# Patient Record
Sex: FEMALE | Race: WHITE | HISPANIC OR LATINO | Employment: FULL TIME | URBAN - METROPOLITAN AREA
[De-identification: names, ages, dates, MRNs, and addresses within clinical notes are randomized per-mention and may not be internally consistent; named-entity substitution may affect disease eponyms.]

---

## 2020-06-06 ENCOUNTER — HOSPITAL ENCOUNTER (OUTPATIENT)
Dept: RADIOLOGY | Facility: MEDICAL CENTER | Age: 45
End: 2020-06-06
Attending: FAMILY MEDICINE
Payer: COMMERCIAL

## 2020-06-06 ENCOUNTER — OCCUPATIONAL MEDICINE (OUTPATIENT)
Dept: URGENT CARE | Facility: PHYSICIAN GROUP | Age: 45
End: 2020-06-06
Payer: COMMERCIAL

## 2020-06-06 VITALS
OXYGEN SATURATION: 99 % | RESPIRATION RATE: 15 BRPM | BODY MASS INDEX: 37.19 KG/M2 | TEMPERATURE: 97.5 F | DIASTOLIC BLOOD PRESSURE: 78 MMHG | SYSTOLIC BLOOD PRESSURE: 112 MMHG | HEIGHT: 61 IN | HEART RATE: 71 BPM | WEIGHT: 197 LBS
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
## 2020-06-06 DIAGNOSIS — S29.019A THORACIC MYOFASCIAL STRAIN, INITIAL ENCOUNTER: ICD-10-CM

## 2020-06-06 DIAGNOSIS — S39.012A STRAIN OF LUMBAR REGION, INITIAL ENCOUNTER: ICD-10-CM

## 2020-06-06 DIAGNOSIS — M54.6 ACUTE BILATERAL THORACIC BACK PAIN: ICD-10-CM

## 2020-06-06 DIAGNOSIS — M54.50 ACUTE BILATERAL LOW BACK PAIN WITHOUT SCIATICA: ICD-10-CM

## 2020-06-06 PROCEDURE — 99203 OFFICE O/P NEW LOW 30 MIN: CPT | Performed by: FAMILY MEDICINE

## 2020-06-06 PROCEDURE — 72070 X-RAY EXAM THORAC SPINE 2VWS: CPT

## 2020-06-06 PROCEDURE — 72100 X-RAY EXAM L-S SPINE 2/3 VWS: CPT

## 2020-06-06 RX ORDER — CYCLOBENZAPRINE HCL 5 MG
5 TABLET ORAL EVERY EVENING
Qty: 15 TAB | Refills: 0 | Status: SHIPPED | OUTPATIENT
Start: 2020-06-06 | End: 2020-06-21

## 2020-06-06 RX ORDER — PHENTERMINE HYDROCHLORIDE 37.5 MG/1
TABLET ORAL DAILY
COMMUNITY
Start: 2020-05-07

## 2020-06-06 ASSESSMENT — PAIN SCALES - GENERAL: PAINLEVEL: 6=MODERATE PAIN

## 2020-06-06 ASSESSMENT — ENCOUNTER SYMPTOMS
SHORTNESS OF BREATH: 0
DIZZINESS: 0
SENSORY CHANGE: 0
FOCAL WEAKNESS: 0
TINGLING: 0
FEVER: 0
VOMITING: 0
FALLS: 1
BACK PAIN: 1

## 2020-06-06 NOTE — LETTER
"EMPLOYEE’S CLAIM FOR COMPENSATION/ REPORT OF INITIAL TREATMENT  FORM C-4    EMPLOYEE’S CLAIM - PROVIDE ALL INFORMATION REQUESTED   First Name  Ellie Last Name  Hao Parra Birthdate                    1975                Sex  female Claim Number   Home Address  [address] Age  45 y.o. Height  1.549 m (5' 1\") Weight  89.4 kg (197 lb) [facility]     [facility] Zip  [address] Telephone  [phone] (home)    Mailing Address  [address] 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 (if applicable)  CLEANING    How did this injury or occupational disease occur? (Be specific an answer in detail. Use additional sheet if necessary)  I WAS ON TOP OF A LADDER CLEANING AND WHEN I TRAY TO GET DOWN I SLIP AD FALL ALL THE WAY TO THE GROUND   If you believe that you have an occupational disease, when did you first have knowledge of the disability and it relationship to your employment?  N/A Witnesses to the Accident  KODIAC      Nature of Injury or Occupational Disease  Contusion  Part(s) of Body Injured or Affected  Lumbar and/or Sacral Vertebrae (Vertebrae NOC Trunk), N/A, N/A    I certify that the above is true and correct to the best of my knowledge and that I " have provided this information in order to obtain the benefits of [place]  I hereby authorize any physician, chiropractor, surgeon, practitioner, or other person, any hospital, including [facility] or [facility], any medical service organization, any insurance company, or other institution or organization to release to each other, any medical or other information, including benefits paid or payable, pertinent to this injury or disease, except information relative to diagnosis, treatment and/or counseling for AIDS, psychological conditions, alcohol or controlled substances, for which I must give specific authorization.  A Photostat of this authorization shall be as valid as the original.     Date   Place   Employee’s Signature   THIS REPORT MUST BE COMPLETED AND MAILED WITHIN 3 WORKING DAYS OF TREATMENT   Place  [facility]  Name of Facility  [facility]   Date  6/6/2020 Diagnosis  (W11.XXXA) Fall from ladder, initial encounter  (S39.012A) Strain of lumbar region, initial encounter  (S29.019A) Thoracic myofascial strain, initial encounter Is there evidence the injured employee was under the influence of alcohol and/or another controlled substance at the time of accident?   Hour  10:15 AM Description of Injury or Disease  Diagnoses of Fall from ladder, initial encounter, Strain of lumbar region, initial encounter, and Thoracic myofascial strain, initial encounter were pertinent to this visit.     Treatment  NSAIDs, heat, light activity, muscle relaxer at night  Have you advised the patient to remain off work five days or more? No   X-Ray Findings      If Yes   From Date  To Date      From information given by the employee, together with medical evidence, can you directly connect this injury or occupational disease as job incurred?    If No Full Duty No Modified Duty  Yes   Is  "additional medical care by a physician indicated?    If Modified Duty, Specify any Limitations / Restrictions  No heavy lifting    Do you know of any previous injury or disease contributing to this condition or occupational disease?                                Date  6/6/2020 Print Doctor’s Name Federico Morales M.D. I certify the employer’s copy of  this form was mailed on:   Address  [address] Insurer’s Use Only     [facility]  [address]    Provider’s Tax ID Number  [id number] Telephone  Dept: [phone]            e-Signature:  ,   Medical Director Degree  MD        ORIGINAL-TREATING PHYSICIAN OR CHIROPRACTOR    PAGE 2-INSURER/TPA    PAGE 3-EMPLOYER    PAGE 4-EMPLOYEE             Form C-4 (rev.10/07)              BRIEF DESCRIPTION OF RIGHTS AND BENEFITS  (Pursuant to [place] 616C.050)    Notice of Injury or Occupational Disease (Incident Report Form C-1): If an injury or occupational disease (OD) arises out of and in the course of employment, you must provide written notice to your employer as soon as practicable, but no later than 7 days after the accident or OD. Your employer shall maintain a sufficient supply of the required forms.    Claim for Compensation (Form C-4): If medical treatment is sought, the form C-4 is available at the place of initial treatment. A completed \"Claim for Compensation\" (Form C-4) must be filed within 90 days after an accident or OD. The treating physician or chiropractor must, within 3 working days after treatment, complete and mail to the employer, the employer's insurer and third-party , the Claim for Compensation.    Medical Treatment: If you require medical treatment for your on-the-job injury or OD, you may be required to select a physician or chiropractor from a list provided by your workers’ compensation insurer, if it has contracted with an Organization for Managed Care (MCO) or Preferred Provider Organization (PPO) or providers of " health care. If your employer has not entered into a contract with an MCO or PPO, you may select a physician or chiropractor from the Panel of Physicians and Chiropractors. Any medical costs related to your industrial injury or OD will be paid by your insurer.    Temporary Total Disability (TTD): If your doctor has certified that you are unable to work for a period of at least 5 consecutive days, or 5 cumulative days in a 20-day period, or places restrictions on you that your employer does not accommodate, you may be entitled to TTD compensation.    Temporary Partial Disability (TPD): If the wage you receive upon reemployment is less than the compensation for TTD to which you are entitled, the insurer may be required to pay you TPD compensation to make up the difference. TPD can only be paid for a maximum of 24 months.    Permanent Partial Disability (PPD): When your medical condition is stable and there is an indication of a PPD as a result of your injury or OD, within 30 days, your insurer must arrange for an evaluation by a rating physician or chiropractor to determine the degree of your PPD. The amount of your PPD award depends on the date of injury, the results of the PPD evaluation and your age and wage.    Permanent Total Disability (PTD): If you are medically certified by a treating physician or chiropractor as permanently and totally disabled and have been granted a PTD status by your insurer, you are entitled to receive monthly benefits not to exceed 66 2/3% of your average monthly wage. The amount of your PTD payments is subject to reduction if you previously received a PPD award.    Vocational Rehabilitation Services: You may be eligible for vocational rehabilitation services if you are unable to return to the job due to a permanent physical impairment or permanent restrictions as a result of your injury or occupational disease.    Transportation and Per Ansley Reimbursement: You may be eligible for travel  expenses and per lakisha associated with medical treatment.    Reopening: You may be able to reopen your claim if your condition worsens after claim closure.    Appeal Process: If you disagree with a written determination issued by the insurer or the insurer does not respond to your request, you may appeal to the Department of Administration, , by following the instructions contained in your determination letter. You must appeal the determination within 70 days from the date of the determination letter at [address], [address], [place], [place] [address], or [address], [address], [place], [place] [address]. If you disagree with the  decision, you may appeal to the Department of Administration, . You must file your appeal within 30 days from the date of the  decision letter at [address], [address], [place], [place] [address], or [address], [facility] [address], [place], [place] [address]. If you disagree with a decision of an , you may file a petition for judicial review with the District Court. You must do so within 30 days of the Appeal Officer’s decision. You may be represented by an  at your own expense or you may contact the [facility] for possible representation.    [place]  for Injured Workers (NAIW): If you disagree with a  decision, you may request that NAIW represent you without charge at an  Hearing. For information regarding denial of benefits, you may contact the [facility] at: [address], [address], [place] [address], [phone], or [address], [address] [facility] [address], [place] [address], [phone]    To File a Complaint with the Division: If you wish to file a complaint with the  of the Division of Industrial Relations (DIR),  please contact the Workers’ Compensation Section, [address], [address], [place], [place] [address],  telephone [phone], or [address] [facility], [place], [place] [address], telephone [phone].    For assistance with Workers’ Compensation Issues: You may contact the Office of the Governor Consumer Health Assistance, [address] [place] [address], [address], [place], [place] [address], Toll Free 1-532.269.5621, Web site: [email or web address]., E-mail [email or web address]                   __________________________________________________________________                                                     _________        Employee Name / Signature                                                                                                                                              Date                                                                                                                                                                                                     D-2 (rev. 06/18)

## 2020-06-06 NOTE — PROGRESS NOTES
"Subjective:     Ellie Parra is a 45 y.o. female who presents for Back Pain (mid to low back pain post falling 4-5 feet off of a ladder at work yesterday)    HPI  Pt presents for evaluation of a new problem   DOI: 6/5/20   JACOB: Fall from a ladder (~4-5 feet high)   Patient landed more on her butt/back  Having moderate low and mid back pain since the accident  No radiating pain into legs  Has bruise on her buttock area, however main pain is much higher  Pain is equal bilaterally and concentrated mostly in the midline  No associated numbness or tingling  Still able to walk, however it is painful     Review of Systems   Constitutional: Negative for fever.   Respiratory: Negative for shortness of breath.    Cardiovascular: Negative for chest pain.   Gastrointestinal: Negative for vomiting.   Musculoskeletal: Positive for back pain and falls.   Skin: Negative for rash.   Neurological: Negative for dizziness, tingling, sensory change and focal weakness.     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     Appearance: She is well-developed. She is not diaphoretic.   HENT:      Head: Normocephalic and atraumatic.   Pulmonary:      Effort: Pulmonary effort is normal.   Musculoskeletal:      Comments: Back:  General: No asymmetry, bruising, or erythema appreciated  Palpation: +TTP of spinous processes in lower thoracic region, no step-offs appreciated, +TTP paraspinal muscles in thoracic and lumbar region, no significant scoliosis appreciated  Strength: 5/5 hip flexion/extension  Special tests: Straight leg raise " -   Neuro: Sensation intact and equal bilaterally in LE's    Bilateral hips:  General: No gross deformity  ROM: flexion 120 degrees, extension 10, ER 60, IR 40, abduction 50, adduction 30   Palpation: No TTP over greater trochanter, groin, pubis, +TTP over right gluteus max, gluteus min  Strength: 5/5 throughout   Special tests: Michelle -, Fadir -, Labral shear -  Neuro: Sensation intact and equal bilaterally in LE's   Skin:     General: Skin is warm and dry.      Findings: No erythema.   Neurological:      Mental Status: She is alert and oriented to person, place, and time.      Sensory: No sensory deficit.   Psychiatric:         Behavior: Behavior normal.         Thought Content: Thought content normal.         Judgment: Judgment normal.         Assessment/Plan:   Assessment    1. Fall from ladder, initial encounter  - DX-LUMBAR SPINE-2 OR 3 VIEWS; Future  - DX-THORACIC SPINE-2 VIEWS; Future  - cyclobenzaprine (FLEXERIL) 5 MG tablet; Take 1 Tab by mouth every evening for 15 days.  Dispense: 15 Tab; Refill: 0    2. Strain of lumbar region, initial encounter  - DX-LUMBAR SPINE-2 OR 3 VIEWS; Future  - cyclobenzaprine (FLEXERIL) 5 MG tablet; Take 1 Tab by mouth every evening for 15 days.  Dispense: 15 Tab; Refill: 0    3. Thoracic myofascial strain, initial encounter  - DX-THORACIC SPINE-2 VIEWS; Future  - cyclobenzaprine (FLEXERIL) 5 MG tablet; Take 1 Tab by mouth every evening for 15 days.  Dispense: 15 Tab; Refill: 0    Patient with fall off a ladder at work causing strains of lumbar and thoracic regions.  X-rays of lumbar and thoracic areas show no acute fracture or dislocation.  Reviewed treatment including over-the-counter NSAIDs, heat, light activity/stretching, avoidance of painful activities, and muscle relaxants at night.  Patient will follow-up in a week to reevaluate and ensure she is healing as expected.

## 2020-06-06 NOTE — LETTER
[facility]  [address] [place] [address]  Phone:  [phone] - Fax:  [phone]   [facility] Progress Report and Disability Certification  Date of Service: 6/6/2020   No Show:  No  Date / Time of Next Visit: 6/13/2020   Claim Information   Patient Name: Ellie Parra  Claim Number:     Employer:   [employer] Date of Injury: 6/5/2020     Insurer / TPA: [employer]  ID / SSN:     Occupation: SANITATION TECH  Diagnosis: Diagnoses of Fall from ladder, initial encounter, Strain of lumbar region, initial encounter, and Thoracic myofascial strain, initial encounter were pertinent to this visit.    Medical Information   Related to Industrial Injury? Yes    Subjective Complaints:  Pt presents for evaluation of a new problem   DOI: 6/5/20   JACOB: Fall from a ladder (~4-5 feet high)   Patient landed more on her butt/back  Having moderate low and mid back pain since the accident  No radiating pain into legs  Has bruise on her buttock area, however main pain is much higher  Pain is equal bilaterally and concentrated mostly in the midline  No associated numbness or tingling  Still able to walk, however it is painful    Objective Findings: Back:  General: No asymmetry, bruising, or erythema appreciated  Palpation: +TTP of spinous processes in lower thoracic region, no step-offs appreciated, +TTP paraspinal muscles in thoracic and lumbar region, no significant scoliosis appreciated  Strength: 5/5 hip flexion/extension  Special tests: Straight leg raise -   Neuro: Sensation intact and equal bilaterally in LE's    Bilateral hips:  General: No gross deformity  ROM: flexion 120 degrees, extension 10, ER 60, IR 40, abduction 50, adduction 30   Palpation: No TTP over greater trochanter, groin, pubis, +TTP over right gluteus max, gluteus min  Strength: 5/5 throughout   Special tests: Michelle -, Fadir -, Labral shear -  Neuro: Sensation intact and equal  bilaterally in LE's    Pre-Existing Condition(s):     Assessment:   Initial Visit    Status: Additional Care Required  Permanent Disability:No    Plan: MedicationDiagnostics    Diagnostics: X-ray    Comments:       Disability Information   Status: Released to Restricted Duty    From:  2020  Through: 2020 Restrictions are: Temporary   Physical Restrictions   Sitting:    Standing:    Stoopin hrs/day Bending:      Squatting:    Walking:    Climbin hrs/day Pushin hrs/day   Pullin hrs/day Other:    Reaching Above Shoulder (L):   Reaching Above Shoulder (R):       Reaching Below Shoulder (L):    Reaching Below Shoulder (R):      Not to exceed Weight Limits   Carrying(hrs):   Weight Limit(lb): < or = to 10 pounds Lifting(hrs):   Weight  Limit(lb): < or = to 10 pounds   Comments:      Repetitive Actions   Hands: i.e. Fine Manipulations from Grasping:     Feet: i.e. Operating Foot Controls:     Driving / Operate Machinery:     Physician Name: Federico Morales M.D. Physician Signature:   e-Signature:  , Medical Director   Clinic Name / Location: [address] [address] Clinic Phone Number: Dept: [phone]   Appointment Time: 9:55 Am Visit Start Time: 10:15 AM   Check-In Time:  10:08 Am Visit Discharge Time:  11:54am     Original-Treating Physician or Chiropractor    Page 2-Insurer/TPA    Page 3-Employer    Page 4-Employee

## 2020-06-08 ENCOUNTER — TELEPHONE (OUTPATIENT)
Dept: URGENT CARE | Facility: PHYSICIAN GROUP | Age: 45
End: 2020-06-08

## 2020-06-08 NOTE — TELEPHONE ENCOUNTER
The original paperwork says 10 lbs weight restriction.  However, because that was apparently not clear, I have updated the D39 to show that the 10 lbs restriction is on pushing and pulling as well. Hopefully that is more clear.  Thanks.

## 2020-06-08 NOTE — TELEPHONE ENCOUNTER
1. 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 Please update letter to be more specific. Thank you.

## 2020-06-13 ENCOUNTER — OCCUPATIONAL MEDICINE (OUTPATIENT)
Dept: URGENT CARE | Facility: PHYSICIAN GROUP | Age: 45
End: 2020-06-13
Payer: COMMERCIAL

## 2020-06-13 VITALS
OXYGEN SATURATION: 100 % | BODY MASS INDEX: 37.19 KG/M2 | TEMPERATURE: 97.1 F | WEIGHT: 197 LBS | DIASTOLIC BLOOD PRESSURE: 76 MMHG | SYSTOLIC BLOOD PRESSURE: 114 MMHG | HEIGHT: 61 IN | RESPIRATION RATE: 16 BRPM | HEART RATE: 91 BPM

## 2020-06-13 DIAGNOSIS — S29.019A THORACIC MYOFASCIAL STRAIN, INITIAL ENCOUNTER: ICD-10-CM

## 2020-06-13 DIAGNOSIS — S39.012D LUMBAR STRAIN, SUBSEQUENT ENCOUNTER: ICD-10-CM
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
## 2020-06-13 PROCEDURE — 99213 OFFICE O/P EST LOW 20 MIN: CPT | Mod: 29 | Performed by: NURSE PRACTITIONER

## 2020-06-13 ASSESSMENT — LIFESTYLE VARIABLES: SUBSTANCE_ABUSE: 0

## 2020-06-13 ASSESSMENT — ENCOUNTER SYMPTOMS
DIZZINESS: 0
CHILLS: 0
FOCAL WEAKNESS: 0
HEADACHES: 0
BACK PAIN: 1
TINGLING: 0
FEVER: 0

## 2020-06-13 ASSESSMENT — PAIN SCALES - GENERAL: PAINLEVEL: 5=MODERATE PAIN

## 2020-06-13 NOTE — PROGRESS NOTES
"Subjective:      Ellie Parra is a 45 y.o. female who presents with Follow-Up (Wc Fv DOI 6/6/2020, back injury, still has a lot of pain)    Reviewed past medical, surgical and family history. Reviewed prescription and OTC medications with patient in electronic health record today  Allergies: Patient has no known allergies.               HPI DOI 05/05/2020  JACOB fall from ladder 4-5 ft. She fell to the ground. Having pain in her lumbar and thoracic back. Unchanged from previous visit. Taking ibuprofen and muscle relaxant. Not sleeping very well due to pain.  Pain 5/10 constantly with intermittent increases. She has been increasing her rest when at home. Following work restrictions.     Review of Systems   Constitutional: Negative for chills and fever.   Genitourinary: Negative for dysuria.   Musculoskeletal: Positive for back pain.   Neurological: Negative for dizziness, tingling, focal weakness and headaches.   Psychiatric/Behavioral: Negative for substance abuse.          Objective:     /76   Pulse 91   Temp 36.2 °C (97.1 °F) (Temporal)   Resp 16   Ht 1.549 m (5' 1\")   Wt 89.4 kg (197 lb)   SpO2 100%   BMI 37.22 kg/m²      Physical Exam  Vitals signs and nursing note reviewed.   Constitutional:       Appearance: Normal appearance. She is normal weight.   Neck:      Musculoskeletal: Normal range of motion.   Cardiovascular:      Rate and Rhythm: Normal rate.      Pulses: Normal pulses.   Pulmonary:      Effort: Pulmonary effort is normal.   Musculoskeletal:      Right hip: Normal.      Left hip: Normal.      Cervical back: Normal.      Thoracic back: She exhibits decreased range of motion, tenderness and pain. She exhibits no bony tenderness, no swelling, no edema, no deformity, no laceration, no spasm and normal pulse.      Lumbar back: She exhibits decreased range of motion, tenderness and pain. She exhibits no bony tenderness, no swelling, no edema, no deformity, no spasm and normal pulse. "   Neurological:      Mental Status: She is alert and oriented to person, place, and time.   Psychiatric:         Mood and Affect: Mood normal.         Thought Content: Thought content normal.         VSS. Stands erect.  Gait antalgic.  + TTP thoracic and lumbar spine.  No step-offs, no bony tenderness.  No ecchymosis, erythema or rash.  No deformity.  Severely limited range of motion: Flexion 20 *, extension 10*, rotation and bends. +2 DTR. MS 5/5 to all extremities.        Assessment/Plan:       1. Fall from ladder, subsequent encounter      2. Thoracic myofascial strain, initial encounter      3. Lumbar strain, subsequent encounter      4. Work related injury      See [place] D39   OTC  analgesic of choice. Follow manufactures dosing and safety precautions.   Work restrictions  Ice/ heat packs prn   Resume all prior  RX medications. Take as prescribed.

## 2020-06-13 NOTE — LETTER
[facility] [place]  [address]  [place] [address]  Phone:  [phone] - Fax:  [phone]   [facility] Progress Report and Disability Certification  Date of Service: 2020   No Show:  No  Date / Time of Next Visit: 2020   Claim Information   Patient Name: Ellie Parra  Claim Number:     Employer:   [employer] Date of Injury: 2020     Insurer / TPA: [employer] [place] ID / SSN:     Occupation: SANITATION TECH  Diagnosis: Diagnoses of Fall from ladder, subsequent encounter, Thoracic myofascial strain, initial encounter, Lumbar strain, subsequent encounter, and Work related injury were pertinent to this visit.    Medical Information   Related to Industrial Injury? Yes    Subjective Complaints:  DOI 2020  JACOB fall from ladder 4-5 ft. She fell to the ground. Having pain in her lumbar and thoracic back. Unchanged from previous visit. Taking ibuprofen and muscle relaxant. Not sleeping very well due to pain.  Pain 5/10 constantly with intermittent increases. She has been increasing her rest when at home. Following work restrictions.    Objective Findings: VSS. Stands erect.  Gait antalgic.  + TTP thoracic and lumbar spine.  No step-offs, no bony tenderness.  No ecchymosis, erythema or rash.  No deformity.  Severely limited range of motion: Flexion 20 *, extension 10*, rotation and bends. +2 DTR. MS 5/5 to all extremities.    Pre-Existing Condition(s):     Assessment:   Condition Same    Status: Additional Care Required  Permanent Disability:No    Plan: Medication  Comments:OTC analgesic of choice. RX as previously prescribed.     Diagnostics:      Comments:       Disability Information   Status: Released to Restricted Duty    From:  2020  Through: 2020 Restrictions are: Temporary   Physical Restrictions   Sitting:    Standing:  < or = to 4 hrs/day Stoopin hrs/day Bending:  < or = to 1 hr/day   Squattin hrs/day  Walking:  < or = to 4 hrs/day Climbin hrs/day Pushin hrs/day   Pullin hrs/day Other:    Reaching Above Shoulder (L):   Reaching Above Shoulder (R):       Reaching Below Shoulder (L):    Reaching Below Shoulder (R):      Not to exceed Weight Limits   Carrying(hrs):   Weight Limit(lb): < or = to 10 pounds Lifting(hrs):   Weight  Limit(lb): < or = to 10 pounds   Comments: Please allow for extra breaks t/o the day.     Repetitive Actions   Hands: i.e. Fine Manipulations from Grasping:     Feet: i.e. Operating Foot Controls:     Driving / Operate Machinery:     Physician Name: Stephanie Baer, A.P.NKenia Physician Signature:   e-Signature: Dr. Russ Darby, Medical Director   Clinic Name / Location: [address] [address] Clinic Phone Number: Dept: [phone]   Appointment Time: 4:00 Pm Visit Start Time: 3:58 PM   Check-In Time:  3:53 Pm Visit Discharge Time: 4:25 PM   Original-Treating Physician or Chiropractor    Page 2-Insurer/TPA    Page 3-Employer    Page 4-Employee

## 2020-06-23 ENCOUNTER — OCCUPATIONAL MEDICINE (OUTPATIENT)
Dept: URGENT CARE | Facility: PHYSICIAN GROUP | Age: 45
End: 2020-06-23
Payer: COMMERCIAL

## 2020-06-23 VITALS
HEART RATE: 80 BPM | SYSTOLIC BLOOD PRESSURE: 104 MMHG | OXYGEN SATURATION: 100 % | HEIGHT: 61 IN | TEMPERATURE: 97.7 F | RESPIRATION RATE: 16 BRPM | BODY MASS INDEX: 37.19 KG/M2 | DIASTOLIC BLOOD PRESSURE: 76 MMHG | WEIGHT: 197 LBS

## 2020-06-23 DIAGNOSIS — S39.012D LUMBAR STRAIN, SUBSEQUENT ENCOUNTER: ICD-10-CM
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
## 2020-06-23 DIAGNOSIS — S29.019D THORACIC MYOFASCIAL STRAIN, SUBSEQUENT ENCOUNTER: ICD-10-CM

## 2020-06-23 PROCEDURE — 99214 OFFICE O/P EST MOD 30 MIN: CPT | Performed by: NURSE PRACTITIONER

## 2020-06-23 ASSESSMENT — ENCOUNTER SYMPTOMS
VOMITING: 0
PALPITATIONS: 0
FEVER: 0
SHORTNESS OF BREATH: 0
HEARTBURN: 0
BACK PAIN: 1
NAUSEA: 0
COUGH: 0
HEADACHES: 0
TINGLING: 0
DIZZINESS: 0
MYALGIAS: 0
CHILLS: 0
WHEEZING: 0
CONSTIPATION: 0
ORTHOPNEA: 0
DIARRHEA: 0

## 2020-06-23 ASSESSMENT — PAIN SCALES - GENERAL: PAINLEVEL: 5=MODERATE PAIN

## 2020-06-23 NOTE — LETTER
[facility] [place]  [address]  [place] [address]  Phone:  [phone] - Fax:  [phone]   [facility] Progress Report and Disability Certification  Date of Service: 6/23/2020   No Show:  No  Date / Time of Next Visit: 6/28/2020   Claim Information   Patient Name: Ellie Parra  Claim Number:     Employer:  [employer] Date of Injury: 6/5/2020     Insurer / TPA: [employer] [place]  ID / SSN: [id number]    Occupation: SANITATION TECH  Diagnosis: Diagnoses of Lumbar strain, subsequent encounter, Fall from ladder, subsequent encounter, and Thoracic myofascial strain, subsequent encounter were pertinent to this visit.    Medical Information   Related to Industrial Injury? Yes    Subjective Complaints:  HPI DOI 05/05/2020  JACOB fall from ladder 4-5 ft. She fell to the ground. Having pain in her lumbar and thoracic back. Unchanged from previous visit. Taking ibuprofen and muscle relaxant. Not sleeping very well due to pain.  Pain 5/10 constantly with intermittent increases. She has been increasing her rest when at home. Following work restrictions    FV: 6/23/2020  Patient reports pain is unchanged from previous visit.  She is still having significant lumbar and thoracic pain despite conservative measures. Reports pain is 5/10 and keeping her awake at night.  She rests as much as possible at home.  She is following work restrictions.   Objective Findings: No acute distress.  Gait antalgic.  + TTP thoracic and lumbar spine.  No step-offs, no bony tenderness.  No ecchymosis, erythema or rash.  No deformity. Decreased ROM with flexion/extension secondary to pain. +2 DTR. Strength 5/5 all extremities.    Pre-Existing Condition(s): None   Assessment:   Condition Same    Status: Additional Care Required  Permanent Disability:No    Plan:   Comments:Nsaids/Muscle relaxers    Diagnostics:      Comments:       Disability Information   Status: Released to Restricted Duty    From:   2020  Through: 2020 Restrictions are: Temporary   Physical Restrictions   Sitting:  < or = to 4 hrs/day Standing:  < or = to 4 hrs/day Stoopin hrs/day Bendin hrs/day   Squattin hrs/day Walking:  < or = to 4 hrs/day Climbin hrs/day Pushin hrs/day   Pullin hrs/day Other:    Reaching Above Shoulder (L):   Reaching Above Shoulder (R):       Reaching Below Shoulder (L):    Reaching Below Shoulder (R):      Not to exceed Weight Limits   Carrying(hrs):   Weight Limit(lb): < or = to 10 pounds Lifting(hrs):   Weight  Limit(lb): < or = to 10 pounds   Comments: Please allow plenty of to for rest breaks throughout the day.  F/U Occ health    Repetitive Actions   Hands: i.e. Fine Manipulations from Grasping:     Feet: i.e. Operating Foot Controls:     Driving / Operate Machinery:     Physician Name: SALINA Churchill Physician Signature: TRACEY Vega e-Signature: Dr. Russ Darby, Medical Director   Clinic Name / Location: [address] [address] Clinic Phone Number: Dept: [phone]   Appointment Time: 3:00 Pm Visit Start Time: 3:11 PM   Check-In Time:  2:55 Pm Visit Discharge Time:  4:09 PM   Original-Treating Physician or Chiropractor    Page 2-Insurer/TPA    Page 3-Employer    Page 4-Employee

## 2020-06-23 NOTE — PROGRESS NOTES
"Subjective:      Ellie Parra is a 45 y.o. female who presents with Back Pain (wc fv, back pain not getting better)      HPI DOI 05/05/2020  JACOB fall from ladder 4-5 ft. She fell to the ground. Having pain in her lumbar and thoracic back. Unchanged from previous visit. Taking ibuprofen and muscle relaxant. Not sleeping very well due to pain.  Pain 5/10 constantly with intermittent increases. She has been increasing her rest when at home. Following work restrictions    FV: 6/23/2020  Patient reports pain is unchanged from previous visit.  She is still having significant lumbar and thoracic pain despite conservative measures. Reports pain is 5/10 and keeping her awake at night.  She rests as much as possible at home.  She is following work restrictions.         Review of Systems   Constitutional: Negative for chills and fever.   Respiratory: Negative for cough, shortness of breath and wheezing.    Cardiovascular: Negative for chest pain, palpitations, orthopnea and leg swelling.   Gastrointestinal: Negative for constipation, diarrhea, heartburn, nausea and vomiting.   Musculoskeletal: Positive for back pain. Negative for joint pain and myalgias.   Skin: Negative for rash.   Neurological: Negative for dizziness, tingling and headaches.   All other systems reviewed and are negative.    PMH: No pertinent past medical history to this problem  MEDS: Medications were reviewed in EMR  ALLERGIES: Allergies were reviewed in EMR  SOCHX: Works as a sanitation tech  FH: No pertinent family history to this problem         Objective:     /76   Pulse 80   Temp 36.5 °C (97.7 °F)   Resp 16   Ht 1.549 m (5' 1\")   Wt 89.4 kg (197 lb)   SpO2 100%   BMI 37.22 kg/m²        Physical Exam  Vitals signs and nursing note reviewed.   Constitutional:       Appearance: Normal appearance. She is normal weight.   Neck:      Musculoskeletal: Normal range of motion.   Cardiovascular:      Rate and Rhythm: Normal rate.      " Pulses: Normal pulses.   Pulmonary:      Effort: Pulmonary effort is normal.   Musculoskeletal:      Right hip: Normal.      Left hip: Normal.      Cervical back: Normal.      Thoracic back: She exhibits decreased range of motion, tenderness and pain. She exhibits no bony tenderness, no swelling, no edema, no deformity, no laceration, no spasm and normal pulse.      Lumbar back: She exhibits decreased range of motion, tenderness and pain. She exhibits no bony tenderness, no swelling, no edema, no deformity, no spasm and normal pulse.   Neurological:      Mental Status: She is alert and oriented to person, place, and time.   Psychiatric:         Mood and Affect: Mood normal.         Thought Content: Thought content normal.      No acute distress.  Gait antalgic.  + TTP thoracic and lumbar spine.  No step-offs, no bony tenderness.  No ecchymosis, erythema or rash.  No deformity. Decreased ROM with flexion/extension secondary to pain. +2 DTR. Strength 5/5 all extremities.        Assessment/Plan:       1. Lumbar strain, subsequent encounter    - REFERRAL TO OCCUPATIONAL MEDICINE    2. Fall from ladder, subsequent encounter    - REFERRAL TO OCCUPATIONAL MEDICINE    3. Thoracic myofascial strain, subsequent encounter  - REFERRAL TO OCCUPATIONAL MEDICINE  Rest, ice/heat therapy discussed, gentle ROM exercises encouraged, OTC ibuprofen, muscle relaxers, work restrictions    Follow/up [facility] in 5 days.

## 2020-06-27 ENCOUNTER — OCCUPATIONAL MEDICINE (OUTPATIENT)
Dept: URGENT CARE | Facility: PHYSICIAN GROUP | Age: 45
End: 2020-06-27
Payer: COMMERCIAL

## 2020-06-27 VITALS
OXYGEN SATURATION: 99 % | HEIGHT: 61 IN | SYSTOLIC BLOOD PRESSURE: 118 MMHG | WEIGHT: 197 LBS | DIASTOLIC BLOOD PRESSURE: 76 MMHG | RESPIRATION RATE: 18 BRPM | BODY MASS INDEX: 37.19 KG/M2 | HEART RATE: 80 BPM | TEMPERATURE: 97.9 F

## 2020-06-27 DIAGNOSIS — S29.019D THORACIC MYOFASCIAL STRAIN, SUBSEQUENT ENCOUNTER: ICD-10-CM

## 2020-06-27 DIAGNOSIS — S39.012D LUMBAR STRAIN, SUBSEQUENT ENCOUNTER: ICD-10-CM

## 2020-06-27 PROCEDURE — 99214 OFFICE O/P EST MOD 30 MIN: CPT | Performed by: FAMILY MEDICINE

## 2020-06-27 ASSESSMENT — ENCOUNTER SYMPTOMS
SENSORY CHANGE: 0
FOCAL WEAKNESS: 0

## 2020-06-27 NOTE — LETTER
[facility] [place]  [address] [place] [address]  Phone:  [phone] - Fax:  [phone]   Occupational Health Network Progress Report and Disability Certification  Date of Service: 6/27/2020   No Show:  No  Date / Time of Next Visit: 7/3/2020   Claim Information   Patient Name: Ellie Parra  Claim Number:     Employer:   [employer] Date of Injury: 6/5/2020     Insurer / TPA: [employer] [place]  ID / SSN:     Occupation: SANITATION TECH  Diagnosis: Diagnoses of Lumbar strain, subsequent encounter and Thoracic myofascial strain, subsequent encounter were pertinent to this visit.    Medical Information   Related to Industrial Injury? Yes    Subjective Complaints:  Date of injury 6/5/2020.  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  Initial x-ray imaging of the thoracic spine and lumbar spine demonstrated no acute fracture with intact alignment.  The employee has been performing light duty since the injury.  The employee was seen in follow-up on 6/13/2020 and 6/232020 and advised to continue light duty.  The employee states 5% improvement yet complains of persistent limiting thoracic and lumbar back pain which is worse with bending twisting, twisting, and intermittently with ambulation.  Employee has been taking ibuprofen yet with complaints of persistent pain as noted above.   Objective Findings: Thoracic back: Bilateral paravertebral muscular tenderness, no specific spasm palpated, no vertebral midline bony point tenderness, decreased range of motion from guarding  Lumbar spine: Paravertebral muscular tenderness to palpation, no specific muscle spasm palpated, no vertebral midline bony point tenderness, forward flexion limited to 45 degrees from guarding and pain, extension 15 degrees, lateral rotation 30 degrees bilaterally with limitations from guarding and pain, lateral flexion 15  degrees bilaterally and limited from guarding and pain, neurovascular intact throughout, normal gait, patient requires holding onto examination table to attempt to have squat   Pre-Existing Condition(s):     Assessment:   Condition Same    Status: Additional Care Required  Permanent Disability:No    Plan:   Comments:Occupational medicine clinic follow-up    Diagnostics:      Comments:       Disability Information   Status: Released to Restricted Duty    From:  2020  Through: 7/3/2020 Restrictions are:     Physical Restrictions   Sitting:    Standing:    Stoopin hrs/day Bendin hrs/day   Squattin hrs/day Walking:    Climbing:    Pushin hrs/day   Pullin hrs/day Other:    Reaching Above Shoulder (L):   Reaching Above Shoulder (R):       Reaching Below Shoulder (L):    Reaching Below Shoulder (R):      Not to exceed Weight Limits   Carrying(hrs): 0 Weight Limit(lb): < or = to 10 pounds Lifting(hrs): 0 Weight  Limit(lb): < or = to 10 pounds   Comments:      Repetitive Actions   Hands: i.e. Fine Manipulations from Grasping:     Feet: i.e. Operating Foot Controls:     Driving / Operate Machinery:     Provider Name:   Francesco Leigh M.D. Physician Signature:  Physician Name:     Clinic Name / Location: [address] [address] Clinic Phone Number: Dept: [phone]   Appointment Time: 3:30 Pm Visit Start Time: 3:29 PM   Check-In Time:  3:08 Pm Visit Discharge Time:  4:14p   Original-Treating Physician or Chiropractor    Page 2-Insurer/TPA    Page 3-Employer    Page 4-Employee

## 2020-06-27 NOTE — PROGRESS NOTES
"Subjective:   Ellie Parra is a 45 y.o. female who presents for Follow-Up ( Fu, back injury, still having alot of pain)    Date of injury 2020.  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  Initial x-ray imaging of the thoracic spine and lumbar spine demonstrated no acute fracture with intact alignment.  The employee has been performing light duty since the injury.  The employee was seen in follow-up on 2020 and  and advised to continue light duty.  The employee states 5% improvement yet complains of persistent limiting thoracic and lumbar back pain which is worse with bending twisting, twisting, and intermittently with ambulation.  Employee has been taking ibuprofen yet with complaints of persistent pain as noted above.   See scanned D 39 form    PMH:  has no past medical history on file.  MEDS:   Current Outpatient Medications:   •  phentermine (ADIPEX-P) 37.5 MG tablet, Take  by mouth every day. Take 1 tablet by mouth once daily, Disp: , Rfl:   •  ibuprofen (MOTRIN) 600 MG TABS, Take 1 Tab by mouth 3 times a day, with meals., Disp: 30 Tab, Rfl:   ALLERGIES: No Known Allergies  SURGHX:   Past Surgical History:   Procedure Laterality Date   • OTHER       AND TUBAL LIGATION   • TUBAL LIGATION       SOCHX:  reports that she has never smoked. She has never used smokeless tobacco. She reports that she does not drink alcohol or use drugs.  FH: No family history on file.  Review of Systems   Neurological: Negative for sensory change and focal weakness.   All other systems reviewed and are negative.       Objective:   /76   Pulse 80   Temp 36.6 °C (97.9 °F) (Temporal)   Resp 18   Ht 1.549 m (5' 1\")   Wt 89.4 kg (197 lb)   SpO2 99%   BMI 37.22 kg/m²   Physical Exam  Vitals signs and nursing note reviewed.   Constitutional:       General: She is not in acute distress.     Appearance: She " is well-developed.   HENT:      Head: Normocephalic and atraumatic.      Right Ear: External ear normal.      Left Ear: External ear normal.      Nose: Nose normal.      Mouth/Throat:      Mouth: Mucous membranes are moist.   Eyes:      Conjunctiva/sclera: Conjunctivae normal.   Cardiovascular:      Rate and Rhythm: Normal rate.   Pulmonary:      Effort: Pulmonary effort is normal. No respiratory distress.      Breath sounds: Normal breath sounds.   Abdominal:      General: There is no distension.   Musculoskeletal: Normal range of motion.   Skin:     General: Skin is warm and dry.   Neurological:      General: No focal deficit present.      Mental Status: She is alert and oriented to person, place, and time. Mental status is at baseline.      Gait: Gait (gait at baseline) normal.   Psychiatric:         Judgment: Judgment normal.       Thoracic back: Bilateral paravertebral muscular tenderness, no specific spasm palpated, no vertebral midline bony point tenderness, decreased range of motion from guarding  Lumbar spine: Paravertebral muscular tenderness to palpation, no specific muscle spasm palpated, no vertebral midline bony point tenderness, forward flexion limited to 45 degrees from guarding and pain, extension 15 degrees, lateral rotation 30 degrees bilaterally with limitations from guarding and pain, lateral flexion 15 degrees bilaterally and limited from guarding and pain, neurovascular intact throughout, normal gait, patient requires holding onto examination table to attempt to have squat   Assessment/Plan:   1. Lumbar strain, subsequent encounter    2. Thoracic myofascial strain, subsequent encounter      See scanned D 39 form    Advised follow-up in occupational medicine clinic with noted prolonged recovery and minimal improvement with current activity restrictions for consideration of physical therapy and/or further imaging and occupational medicine management.  Continue with restrictions as noted in the D  39 form        Please note that this dictation was created using voice recognition software. I have worked with consultants from the vendor as well as technical experts from [facility] to optimize the interface. I have made every reasonable attempt to correct obvious errors, but I expect that there are errors of grammar and possibly content that I did not discover before finalizing the note.

## 2020-07-01 ENCOUNTER — OCCUPATIONAL MEDICINE (OUTPATIENT)
Dept: OCCUPATIONAL MEDICINE | Facility: CLINIC | Age: 45
End: 2020-07-01
Payer: COMMERCIAL

## 2020-07-01 VITALS
SYSTOLIC BLOOD PRESSURE: 124 MMHG | RESPIRATION RATE: 12 BRPM | BODY MASS INDEX: 37.19 KG/M2 | HEART RATE: 85 BPM | WEIGHT: 197 LBS | TEMPERATURE: 98 F | DIASTOLIC BLOOD PRESSURE: 68 MMHG | HEIGHT: 61 IN | OXYGEN SATURATION: 96 %

## 2020-07-01 DIAGNOSIS — S29.019D THORACIC MYOFASCIAL STRAIN, SUBSEQUENT ENCOUNTER: ICD-10-CM

## 2020-07-01 DIAGNOSIS — S39.012D STRAIN OF LUMBAR REGION, SUBSEQUENT ENCOUNTER: ICD-10-CM

## 2020-07-01 PROCEDURE — 99204 OFFICE O/P NEW MOD 45 MIN: CPT | Performed by: PREVENTIVE MEDICINE

## 2020-07-01 RX ORDER — TIZANIDINE 4 MG/1
4 TABLET ORAL
Qty: 20 TAB | Refills: 0 | Status: SHIPPED | OUTPATIENT
Start: 2020-07-01 | End: 2020-08-17 | Stop reason: SDUPTHER

## 2020-07-01 RX ORDER — PREDNISONE 20 MG/1
40 TABLET ORAL DAILY
Qty: 14 TAB | Refills: 0 | Status: SHIPPED | OUTPATIENT
Start: 2020-07-01 | End: 2020-07-08

## 2020-07-01 ASSESSMENT — PAIN SCALES - GENERAL: PAINLEVEL: 5=MODERATE PAIN

## 2020-07-01 NOTE — LETTER
[address],   [address] [place] [address]  Phone:  [phone] - Fax:  [phone]   Occupational Health [facility] Progress Report and Disability Certification  Date of Service: 7/1/2020   No Show:  No  Date / Time of Next Visit: 7/23/2020 @ 4:15 PM   Claim Information   Patient Name: Ellie Parra  Claim Number:     Employer:   [employer] Date of Injury: 6/5/2020     Insurer / TPA: [employer] [place]  ID / SSN:     Occupation: SANITATION TECH  Diagnosis: Diagnoses of Strain of lumbar region, subsequent encounter and Thoracic myofascial strain, subsequent encounter were pertinent to this visit.    Medical Information   Related to Industrial Injury? Yes    Subjective Complaints:  DOI 6/5/2020.  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  Initial x-ray imaging of the thoracic spine and lumbar spine demonstrated no acute fracture.  Patient states overall pain is the same.  Pain continues be mostly in the lower lumbar region but does get some pain up through the mid back.  Gets radiating pain down the right leg.  Denies any weakness.  Denies numbness or tingling.  Not currently taking any medications.  Has not been to physical therapy.  Denies prior low back injuries.   Objective Findings: Lumbothoracic: No gross deformity.  Tenderness bilateral lower paraspinal musculature and SI joint tenderness, worse on right.  Straight leg test positive on right.  Reflexes intact.  Strength intact.   Pre-Existing Condition(s):     Assessment:   Condition Same    Status: Additional Care Required  Permanent Disability:No    Plan:      Diagnostics:      Comments:  Referral to physical therapy  Prescribed tizanidine and prednisone  Gentle range of motion exercises  OTC muscle creams/ointments as needed  Restricted duty  Follow-up 3 weeks, if no improvement will consider further  imaging with MRI    Disability Information   Status: Released to Restricted Duty    From:  7/1/2020  Through: 7/23/2020 Restrictions are: Temporary   Physical Restrictions   Sitting:    Standing:    Stooping:  < or = to 1 hr/day Bending:  < or = to 1 hr/day   Squatting:    Walking:    Climbing:    Pushing:  < or = to 1 hr/day   Pulling:  < or = to 1 hr/day Other:    Reaching Above Shoulder (L):   Reaching Above Shoulder (R):       Reaching Below Shoulder (L):    Reaching Below Shoulder (R):      Not to exceed Weight Limits   Carrying(hrs):   Weight Limit(lb): < or = to 10 pounds Lifting(hrs):   Weight  Limit(lb): < or = to 10 pounds   Comments:      Repetitive Actions   Hands: i.e. Fine Manipulations from Grasping:     Feet: i.e. Operating Foot Controls:     Driving / Operate Machinery:     Provider Name:   Pancho Yarbrough D.O. Physician Signature:  Physician Name:     Clinic Name / Location: [address],   [address] [address] Clinic Phone Number: Dept: [phone]   Appointment Time: 4:15 Pm Visit Start Time: 4:24 PM   Check-In Time:  4:12 Pm Visit Discharge Time:  4:58 PM   Original-Treating Physician or Chiropractor    Page 2-Insurer/TPA    Page 3-Employer    Page 4-Employee

## 2020-07-02 NOTE — PROGRESS NOTES
"Subjective:     Ellie Parra is a 45 y.o. female who presents for Other (back pain #19)      DOI 6/5/2020.  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  Initial x-ray imaging of the thoracic spine and lumbar spine demonstrated no acute fracture.  Patient states overall pain is the same.  Pain continues be mostly in the lower lumbar region but does get some pain up through the mid back.  Gets radiating pain down the right leg.  Denies any weakness.  Denies numbness or tingling.  Not currently taking any medications.  Has not been to physical therapy.  Denies prior low back injuries.    ROS: All systems were reviewed on intake form, form was reviewed and signed. See scanned documents in media. Pertinent positives and negatives included in HPI.    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 Appears well-developed and well-nourished.   HENT: Normocephalic and atraumatic. EOM are normal. No scleral icterus.   Cardiovascular: Normal rate.    Pulmonary/Chest: Effort normal. No respiratory distress.   Neurological: Patient is alert and oriented to person, place, and time.   Skin: Skin is warm and dry.   Psychiatric: Normal mood and affect. Behavior is normal.     Lumbothoracic: No gross deformity.  Tenderness bilateral lower paraspinal musculature and SI joint tenderness, worse on right.  Straight leg test positive on right.  Reflexes intact.  Strength intact.    Assessment/Plan: "       1. Strain of lumbar region, subsequent encounter  - REFERRAL TO PHYSICAL THERAPY Reason for Therapy: Eval/Treat/Report  - predniSONE (DELTASONE) 20 MG Tab; Take 2 Tabs by mouth every day for 7 days.  Dispense: 14 Tab; Refill: 0  - tizanidine (ZANAFLEX) 4 MG Tab; Take 1 Tab by mouth at bedtime as needed.  Dispense: 20 Tab; Refill: 0    2. Thoracic myofascial strain, subsequent encounter  - REFERRAL TO PHYSICAL THERAPY Reason for Therapy: Eval/Treat/Report  - predniSONE (DELTASONE) 20 MG Tab; Take 2 Tabs by mouth every day for 7 days.  Dispense: 14 Tab; Refill: 0  - tizanidine (ZANAFLEX) 4 MG Tab; Take 1 Tab by mouth at bedtime as needed.  Dispense: 20 Tab; Refill: 0    • Referral to physical therapy  • Prescribed tizanidine and prednisone  • Gentle range of motion exercises  • OTC muscle creams/ointments as needed  • Restricted duty  • Follow-up 3 weeks, if no improvement will consider further imaging with MRI    Differential diagnosis, natural history, supportive care, and indications for immediate follow-up discussed.

## 2020-08-17 ENCOUNTER — OCCUPATIONAL MEDICINE (OUTPATIENT)
Dept: OCCUPATIONAL MEDICINE | Facility: CLINIC | Age: 45
End: 2020-08-17
Payer: COMMERCIAL

## 2020-08-17 VITALS
DIASTOLIC BLOOD PRESSURE: 74 MMHG | SYSTOLIC BLOOD PRESSURE: 128 MMHG | WEIGHT: 197 LBS | OXYGEN SATURATION: 96 % | TEMPERATURE: 97.9 F | HEIGHT: 67 IN | HEART RATE: 78 BPM | RESPIRATION RATE: 12 BRPM | BODY MASS INDEX: 30.92 KG/M2

## 2020-08-17 DIAGNOSIS — S29.019D THORACIC MYOFASCIAL STRAIN, SUBSEQUENT ENCOUNTER: ICD-10-CM

## 2020-08-17 DIAGNOSIS — S39.012D STRAIN OF LUMBAR REGION, SUBSEQUENT ENCOUNTER: ICD-10-CM

## 2020-08-17 PROCEDURE — 99214 OFFICE O/P EST MOD 30 MIN: CPT | Performed by: PREVENTIVE MEDICINE

## 2020-08-17 RX ORDER — DICLOFENAC SODIUM 75 MG/1
75 TABLET, DELAYED RELEASE ORAL 2 TIMES DAILY
Qty: 60 TAB | Refills: 0 | Status: SHIPPED | OUTPATIENT
Start: 2020-08-17 | End: 2020-09-21

## 2020-08-17 RX ORDER — TIZANIDINE 4 MG/1
4 TABLET ORAL
Qty: 20 TAB | Refills: 0 | Status: SHIPPED | OUTPATIENT
Start: 2020-08-17

## 2020-08-17 ASSESSMENT — ENCOUNTER SYMPTOMS: TINGLING: 1

## 2020-08-17 ASSESSMENT — PAIN SCALES - GENERAL: PAINLEVEL: 5=MODERATE PAIN

## 2020-08-17 NOTE — PROGRESS NOTES
"Subjective:     Ellie Parra is a 45 y.o. female who presents for Other (#16)      DOI 6/5/2020.  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  Patient states overall symptoms about the same.  She has pain going down the legs bilaterally but more so on the right.  She states that tizanidine helped a little bit.  She tested positive a month ago for COVID-19 and so was unable to attend therapy for about 3 weeks.    Review of Systems   Skin: Negative for itching and rash.   Neurological: Positive for tingling.       PMH: No pertinent past medical history to this problem  MEDS: Medications were reviewed in Epic  ALLERGIES: No Known Allergies  SOCHX: Works as a Reflex tech  FH: No pertinent family history to this problem       Objective:     /74   Pulse 78   Temp 36.6 °C (97.9 °F)   Resp 12   Ht 1.702 m (5' 7\")   Wt 89.4 kg (197 lb)   SpO2 96%   BMI 30.85 kg/m²     Constitutional: Patient is in no acute distress. Appears well-developed and well-nourished.   HENT: Normocephalic and atraumatic. EOM are normal. No scleral icterus.   Cardiovascular: Normal rate.    Pulmonary/Chest: Effort normal. No respiratory distress.   Neurological: Patient is alert and oriented to person, place, and time.   Skin: Skin is warm and dry.   Psychiatric: Normal mood and affect. Behavior is normal.     Lumbothoracic: No gross deformity.  Range of motion diminished with flexion.  Normal gait.    Assessment/Plan:       1. Strain of lumbar region, subsequent encounter  - tizanidine (ZANAFLEX) 4 MG Tab; Take 1 Tab by mouth at bedtime as needed.  Dispense: 20 Tab; Refill: 0  - diclofenac DR (VOLTAREN) 75 MG Tablet Delayed Response; Take 1 Tab by mouth 2 times a day.  Dispense: 60 Tab; Refill: 0  - REFERRAL TO RADIOLOGY  - MR-LUMBAR SPINE-W/O; Future    2. Thoracic myofascial strain, subsequent encounter  - tizanidine (ZANAFLEX) 4 MG Tab; " Take 1 Tab by mouth at bedtime as needed.  Dispense: 20 Tab; Refill: 0  - diclofenac DR (VOLTAREN) 75 MG Tablet Delayed Response; Take 1 Tab by mouth 2 times a day.  Dispense: 60 Tab; Refill: 0  - REFERRAL TO RADIOLOGY  - MR-THORACIC SPINE-W/O; Future    • Placed referral for MRI lumbar spine  • Continue physical therapy  • Prescribed tizanidine and diclofenac  • Gentle range of motion exercises  • OTC muscle creams/ointments as needed  • Restricted duty  • Follow-up 3 weeks, sooner if MRI performed sooner    Differential diagnosis, natural history, supportive care, and indications for immediate follow-up discussed.

## 2020-08-17 NOTE — LETTER
[address],   [address] [place] [address]  Phone:  [phone] - Fax:  [phone]   Occupational Health [facility] Progress Report and Disability Certification  Date of Service: 8/17/2020   No Show:  No  Date / Time of Next Visit: 9/9/2020 @ 1:45 PM   Claim Information   Patient Name: Ellie Parra  Claim Number:     Employer:   [employer] Date of Injury: 6/5/2020     Insurer / TPA: [employer] [place]  ID / SSN:     Occupation: SANITATION TECH  Diagnosis: Diagnoses of Strain of lumbar region, subsequent encounter and Thoracic myofascial strain, subsequent encounter were pertinent to this visit.    Medical Information   Related to Industrial Injury? Yes    Subjective Complaints:  DOI 6/5/2020.  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  Patient states overall symptoms about the same.  She has pain going down the legs bilaterally but more so on the right.  She states that tizanidine helped a little bit.  She tested positive a month ago for COVID-19 and so was unable to attend therapy for about 3 weeks.   Objective Findings: Lumbothoracic: No gross deformity.  Range of motion diminished with flexion.  Normal gait.   Pre-Existing Condition(s):     Assessment:   Condition Same    Status: Additional Care Required  Permanent Disability:No    Plan:      Diagnostics:      Comments:  Placed referral for MRI lumbar spine  Continue physical therapy  Prescribed tizanidine and diclofenac  Gentle range of motion exercises  OTC muscle creams/ointments as needed  Restricted duty  Follow-up 3 weeks, sooner if MRI performed sooner    Disability Information   Status: Released to Restricted Duty    From:  8/17/2020  Through: 9/9/2020 Restrictions are: Temporary   Physical Restrictions   Sitting:    Standing:    Stooping:  < or = to 1 hr/day Bending:  < or = to 1 hr/day   Squatting:     Walking:    Climbing:    Pushing:  < or = to 1 hr/day   Pulling:  < or = to 1 hr/day Other:    Reaching Above Shoulder (L):   Reaching Above Shoulder (R):       Reaching Below Shoulder (L):    Reaching Below Shoulder (R):      Not to exceed Weight Limits   Carrying(hrs):   Weight Limit(lb): < or = to 10 pounds Lifting(hrs):   Weight  Limit(lb): < or = to 10 pounds   Comments:      Repetitive Actions   Hands: i.e. Fine Manipulations from Grasping:     Feet: i.e. Operating Foot Controls:     Driving / Operate Machinery:     Provider Name:   Pancho Yarbrough D.O. Physician Signature:  Physician Name:     Clinic Name / Location: [address],   [address] [address] Clinic Phone Number: Dept: [phone]   Appointment Time: 3:15 Pm Visit Start Time: 3:08 PM   Check-In Time:  3:03 Pm Visit Discharge Time:  3:30 PM   Original-Treating Physician or Chiropractor    Page 2-Insurer/TPA    Page 3-Employer    Page 4-Employee

## 2020-09-09 ENCOUNTER — OCCUPATIONAL MEDICINE (OUTPATIENT)
Dept: OCCUPATIONAL MEDICINE | Facility: CLINIC | Age: 45
End: 2020-09-09
Payer: COMMERCIAL

## 2020-09-09 ENCOUNTER — TELEPHONE (OUTPATIENT)
Dept: OCCUPATIONAL MEDICINE | Facility: CLINIC | Age: 45
End: 2020-09-09

## 2020-09-09 VITALS
TEMPERATURE: 98.1 F | SYSTOLIC BLOOD PRESSURE: 134 MMHG | DIASTOLIC BLOOD PRESSURE: 72 MMHG | HEIGHT: 67 IN | RESPIRATION RATE: 18 BRPM | BODY MASS INDEX: 31.3 KG/M2 | OXYGEN SATURATION: 97 % | WEIGHT: 199.4 LBS | HEART RATE: 100 BPM

## 2020-09-09 DIAGNOSIS — S39.012D STRAIN OF LUMBAR REGION, SUBSEQUENT ENCOUNTER: ICD-10-CM

## 2020-09-09 DIAGNOSIS — S29.019D THORACIC MYOFASCIAL STRAIN, SUBSEQUENT ENCOUNTER: ICD-10-CM

## 2020-09-09 PROCEDURE — 99213 OFFICE O/P EST LOW 20 MIN: CPT | Performed by: PREVENTIVE MEDICINE

## 2020-09-09 ASSESSMENT — ENCOUNTER SYMPTOMS
TINGLING: 1
SENSORY CHANGE: 1

## 2020-09-09 NOTE — PROGRESS NOTES
"Subjective:     Ellie Parra is a 45 y.o. female who presents for Follow-Up ([facility] DOI 6/5/20 back, worse, rm 16)      DOI 6/5/2020.  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  Patient states overall she feels that the pain has been worsening.  She states that her workplace has been having her do things outside of her work restrictions including not being for several hours, doing other pushing/pulling activities for several hours at a time.  Also having her lift things more than 10 pounds.  She continues to have pain in the lower back, worse on the right side with radiating pain down the right leg.  She states the diclofenac and tizanidine do seem to help a little bit.  Physical therapy seems up a little bit but has not gotten much improvement yet.  She has her MRI scheduled for this Saturday    Review of Systems   Neurological: Positive for tingling and sensory change.       SOCHX: Works as a Sanitation Tech at [employer]  FH: No pertinent family history to this problem.       Objective:     /72   Pulse 100   Temp 36.7 °C (98.1 °F)   Resp 18   Ht 1.702 m (5' 7\")   Wt 90.4 kg (199 lb 6.4 oz)   SpO2 97%   BMI 31.23 kg/m²     Constitutional: Patient is in no acute distress. Appears well-developed and well-nourished.   Cardiovascular: Normal rate.    Pulmonary/Chest: Effort normal. No respiratory distress.   Neurological: Patient is alert and oriented to person, place, and time.   Skin: Skin is warm and dry.   Psychiatric: Normal mood and affect. Behavior is normal.     Lumbothoracic: No gross deformity.  Mild tenderness of her right paraspinal musculature.  Range of motion diminished with flexion.  Normal gait.    Assessment/Plan:       1. Strain of lumbar region, subsequent encounter    2. Thoracic myofascial strain, subsequent encounter    • Referral for MRI lumbar spine approved scheduled for September " 12  • Continue physical therapy  • Continue tizanidine and diclofenac as needed  • Gentle range of motion exercises  • OTC muscle creams/ointments as needed  • Restricted duty  • Follow-up next Tuesd  • ay    Differential diagnosis, natural history, supportive care, and indications for immediate follow-up discussed.

## 2020-09-09 NOTE — LETTER
[address],   [address] [place] [address]  Phone:  [phone] - Fax:  [phone]   Occupational Health [facility] Progress Report and Disability Certification  Date of Service: 9/9/2020   No Show:  No  Date / Time of Next Visit: 9/15/2020 @ 4:15 PM   Claim Information   Patient Name: Ellie Parra  Claim Number:     Employer:   [employer] Date of Injury: 6/5/2020     Insurer / TPA: [employer] [place]  ID / SSN:     Occupation: SANITATION TECH  Diagnosis: Diagnoses of Strain of lumbar region, subsequent encounter and Thoracic myofascial strain, subsequent encounter were pertinent to this visit.    Medical Information   Related to Industrial Injury? Yes    Subjective Complaints:  DOI 6/5/2020.  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  Patient states overall she feels that the pain has been worsening.  She states that her workplace has been having her do things outside of her work restrictions including not being for several hours, doing other pushing/pulling activities for several hours at a time.  Also having her lift things more than 10 pounds.  She continues to have pain in the lower back, worse on the right side with radiating pain down the right leg.  She states the diclofenac and tizanidine do seem to help a little bit.  Physical therapy seems up a little bit but has not gotten much improvement yet.  She has her MRI scheduled for this Saturday   Objective Findings: Lumbothoracic: No gross deformity.  Mild tenderness of her right paraspinal musculature.  Range of motion diminished with flexion.  Normal gait.   Pre-Existing Condition(s):     Assessment:   Condition Same    Status: Additional Care Required  Permanent Disability:No    Plan:      Diagnostics:      Comments:  Referral for MRI lumbar spine approved scheduled for September 12  Continue physical  therapy  Continue tizanidine and diclofenac as needed  Gentle range of motion exercises  OTC muscle creams/ointments as needed  Restricted duty  Follow-up next Tuesd  ay    Disability Information   Status: Released to Restricted Duty    From:  2020  Through: 9/15/2020 Restrictions are: Temporary   Physical Restrictions   Sitting:    Standing:  < or = to 6 hrs/day Stooping:  < or = to 1 hr/day Bending:  < or = to 1 hr/day   Squatting:    Walking:  < or = to 6 hrs/day Climbin hrs/day Pushin hrs/day   Pullin hrs/day Other:    Reaching Above Shoulder (L):   Reaching Above Shoulder (R):       Reaching Below Shoulder (L):    Reaching Below Shoulder (R):      Not to exceed Weight Limits   Carrying(hrs):   Weight Limit(lb): < or = to 10 pounds Lifting(hrs):   Weight  Limit(lb): < or = to 10 pounds   Comments: No mopping    Repetitive Actions   Hands: i.e. Fine Manipulations from Grasping:     Feet: i.e. Operating Foot Controls:     Driving / Operate Machinery:     Provider Name:   Pancho Yarbrough D.O. Physician Signature:  Physician Name:     Clinic Name / Location: [address],   [address] [place] [address] Clinic Phone Number: Dept: [phone]   Appointment Time: 1:45 Pm Visit Start Time: 1:47 PM   Check-In Time:  1:38 Pm Visit Discharge Time:  2:10 PM   Original-Treating Physician or Chiropractor    Page 2-Insurer/TPA    Page 3-Employer    Page 4-Employee

## 2020-09-15 ENCOUNTER — OCCUPATIONAL MEDICINE (OUTPATIENT)
Dept: OCCUPATIONAL MEDICINE | Facility: CLINIC | Age: 45
End: 2020-09-15
Payer: COMMERCIAL

## 2020-09-15 VITALS
WEIGHT: 199 LBS | OXYGEN SATURATION: 96 % | BODY MASS INDEX: 31.23 KG/M2 | HEIGHT: 67 IN | HEART RATE: 93 BPM | DIASTOLIC BLOOD PRESSURE: 82 MMHG | RESPIRATION RATE: 14 BRPM | TEMPERATURE: 97.9 F | SYSTOLIC BLOOD PRESSURE: 126 MMHG

## 2020-09-15 DIAGNOSIS — S29.019D THORACIC MYOFASCIAL STRAIN, SUBSEQUENT ENCOUNTER: ICD-10-CM

## 2020-09-15 DIAGNOSIS — S39.012D STRAIN OF LUMBAR REGION, SUBSEQUENT ENCOUNTER: ICD-10-CM

## 2020-09-15 DIAGNOSIS — M51.36 BULGE OF LUMBAR DISC WITHOUT MYELOPATHY: ICD-10-CM

## 2020-09-15 PROCEDURE — 99213 OFFICE O/P EST LOW 20 MIN: CPT | Performed by: PREVENTIVE MEDICINE

## 2020-09-15 ASSESSMENT — ENCOUNTER SYMPTOMS
SENSORY CHANGE: 1
TINGLING: 1

## 2020-09-15 NOTE — LETTER
[address],   [address] [place] [address]  Phone:  [phone] - Fax:  [phone]   Occupational Health [facility] Progress Report and Disability Certification  Date of Service: 9/15/2020   No Show:  No  Date / Time of Next Visit:  [facility] Physiatry   Claim Information   Patient Name: Ellie Parra  Claim Number:     Employer:   [employer] Date of Injury: 6/5/2020     Insurer / TPA: [employer] [place]  ID / SSN:     Occupation: SANITATION TECH  Diagnosis: Diagnoses of Strain of lumbar region, subsequent encounter, Thoracic myofascial strain, subsequent encounter, and Bulge of lumbar disc without myelopathy were pertinent to this visit.    Medical Information   Related to Industrial Injury? Yes    Subjective Complaints:  DOI 6/5/2020.  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  Patient states overall symptoms are the same.  She was just seen here last week and is here for MRI results.   Objective Findings: Lumbothoracic: No gross deformity. Range of motion mildly diminished with flexion.  Normal gait.    MRI Thoracic: Small disc protrusions at T5-6, T6-7, T7-8, and T8-9 without central canal stenosis or neural foraminal narrowing.  MRI Lumbar: Small disc bulge at L5-S1 producing mild central canal stenosis and mild bilateral neural foraminal narrowing. Shallow central disc protrusion with annular tear at L3-4 without central canal stenosis or neural foraminal narrowing   Pre-Existing Condition(s):     Assessment:   Condition Same    Status: Discharged / Care Transfer  Permanent Disability:No    Plan:      Diagnostics:      Comments:  Given MRI findings and continued symptoms will refer to physiatry  Continue physical therapy  Continue tizanidine and diclofenac as needed  Gentle range of motion exercises  OTC muscle creams/ointments as needed  Restricted duty, until cleared  by luis valle  Follow-up one month if not seen by Physiatry    Disability Information   Status: Released to Restricted Duty    From:  9/15/2020  Through:   Restrictions are: Temporary   Physical Restrictions   Sitting:    Standing:  < or = to 6 hrs/day Stooping:  < or = to 1 hr/day Bending:  < or = to 1 hr/day   Squatting:    Walking:  < or = to 6 hrs/day Climbing:    Pushing:  < or = to 1 hr/day   Pulling:  < or = to 1 hr/day Other:    Reaching Above Shoulder (L):   Reaching Above Shoulder (R):       Reaching Below Shoulder (L):    Reaching Below Shoulder (R):      Not to exceed Weight Limits   Carrying(hrs):   Weight Limit(lb): < or = to 10 pounds Lifting(hrs):   Weight  Limit(lb): < or = to 10 pounds   Comments: No mopping     Repetitive Actions   Hands: i.e. Fine Manipulations from Grasping:     Feet: i.e. Operating Foot Controls:     Driving / Operate Machinery:     Provider Name:   Pancho Yarbrough D.O. Physician Signature:  Physician Name:     Clinic Name / Location: [address],   [address] [place] [id number] Clinic Phone Number: Dept: [phone]   Appointment Time: 4:15 Pm Visit Start Time: 4:25 PM   Check-In Time:  4:08 Pm Visit Discharge Time:  4:49 PM   Original-Treating Physician or Chiropractor    Page 2-Insurer/TPA    Page 3-Employer    Page 4-Employee

## 2020-09-15 NOTE — PROGRESS NOTES
"Subjective:     Ellie Parra is a 45 y.o. female who presents for Follow-Up ([facility] DOI 6/5/20 back, same, rm 16)      DOI 6/5/2020.  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  Patient states overall symptoms are the same.  She was just seen here last week and is here for MRI results.    Review of Systems   Neurological: Positive for tingling and sensory change.       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       Objective:     /82   Pulse 93   Temp 36.6 °C (97.9 °F)   Resp 14   Ht 1.702 m (5' 7\")   Wt 90.3 kg (199 lb)   SpO2 96%   BMI 31.17 kg/m²     Constitutional: Patient is in no acute distress. Appears well-developed and well-nourished.   Cardiovascular: Normal rate.    Pulmonary/Chest: Effort normal. No respiratory distress.   Neurological: Patient is alert and oriented to person, place, and time.   Skin: Skin is warm and dry.   Psychiatric: Normal mood and affect. Behavior is normal.     Lumbothoracic: No gross deformity. Range of motion mildly diminished with flexion.  Normal gait.    MRI Thoracic: Small disc protrusions at T5-6, T6-7, T7-8, and T8-9 without central canal stenosis or neural foraminal narrowing.  MRI Lumbar: Small disc bulge at L5-S1 producing mild central canal stenosis and mild bilateral neural foraminal narrowing. Shallow central disc protrusion with annular tear at L3-4 without central canal stenosis or neural foraminal narrowing    Assessment/Plan:       1. Strain of lumbar region, subsequent encounter  - REFERRAL TO PHYSIATRY (PMR)    2. Thoracic myofascial strain, subsequent encounter  - REFERRAL TO PHYSIATRY (PMR)    3. Bulge of lumbar disc without myelopathy  - REFERRAL TO PHYSIATRY (PMR)    • Given MRI findings and continued symptoms will refer to physiatry  • Continue physical therapy  • Continue tizanidine and " diclofenac as needed  • Gentle range of motion exercises  • OTC muscle creams/ointments as needed  • Restricted duty, until cleared by phy  • siatry  • Follow-up one month if not seen by Physiatry    Differential diagnosis, natural history, supportive care, and indications for immediate follow-up discussed.

## 2020-09-20 DIAGNOSIS — S29.019D THORACIC MYOFASCIAL STRAIN, SUBSEQUENT ENCOUNTER: ICD-10-CM

## 2020-09-20 DIAGNOSIS — S39.012D STRAIN OF LUMBAR REGION, SUBSEQUENT ENCOUNTER: ICD-10-CM

## 2020-09-21 RX ORDER — DICLOFENAC SODIUM 75 MG/1
TABLET, DELAYED RELEASE ORAL
Qty: 60 TAB | Refills: 0 | Status: SHIPPED | OUTPATIENT
Start: 2020-09-21 | End: 2020-10-19

## 2020-10-19 DIAGNOSIS — S29.019D THORACIC MYOFASCIAL STRAIN, SUBSEQUENT ENCOUNTER: ICD-10-CM

## 2020-10-19 DIAGNOSIS — S39.012D STRAIN OF LUMBAR REGION, SUBSEQUENT ENCOUNTER: ICD-10-CM

## 2020-10-19 RX ORDER — DICLOFENAC SODIUM 75 MG/1
TABLET, DELAYED RELEASE ORAL
Qty: 60 TAB | Refills: 0 | Status: SHIPPED | OUTPATIENT
Start: 2020-10-19

## 2025-06-13 ENCOUNTER — HOSPITAL ENCOUNTER (OUTPATIENT)
Dept: LAB | Facility: MEDICAL CENTER | Age: 50
End: 2025-06-13
Attending: FAMILY MEDICINE
Payer: COMMERCIAL

## 2025-06-13 LAB
ALBUMIN SERPL BCP-MCNC: 4.1 G/DL (ref 3.2–4.9)
ALBUMIN/GLOB SERPL: 1.2 G/DL
ALP SERPL-CCNC: 99 U/L (ref 30–99)
ALT SERPL-CCNC: 60 U/L (ref 2–50)
ANION GAP SERPL CALC-SCNC: 12 MMOL/L (ref 7–16)
AST SERPL-CCNC: 42 U/L (ref 12–45)
BASOPHILS # BLD AUTO: 0.9 % (ref 0–1.8)
BASOPHILS # BLD: 0.11 K/UL (ref 0–0.12)
BILIRUB SERPL-MCNC: 0.4 MG/DL (ref 0.1–1.5)
BUN SERPL-MCNC: 9 MG/DL (ref 8–22)
CALCIUM ALBUM COR SERPL-MCNC: 9.5 MG/DL (ref 8.5–10.5)
CALCIUM SERPL-MCNC: 9.6 MG/DL (ref 8.5–10.5)
CHLORIDE SERPL-SCNC: 103 MMOL/L (ref 96–112)
CHOLEST SERPL-MCNC: 210 MG/DL (ref 100–199)
CO2 SERPL-SCNC: 22 MMOL/L (ref 20–33)
CREAT SERPL-MCNC: 0.58 MG/DL (ref 0.5–1.4)
EOSINOPHIL # BLD AUTO: 0.25 K/UL (ref 0–0.51)
EOSINOPHIL NFR BLD: 2.1 % (ref 0–6.9)
ERYTHROCYTE [DISTWIDTH] IN BLOOD BY AUTOMATED COUNT: 42.8 FL (ref 35.9–50)
EST. AVERAGE GLUCOSE BLD GHB EST-MCNC: 117 MG/DL
GFR SERPLBLD CREATININE-BSD FMLA CKD-EPI: 109 ML/MIN/1.73 M 2
GLOBULIN SER CALC-MCNC: 3.4 G/DL (ref 1.9–3.5)
GLUCOSE SERPL-MCNC: 96 MG/DL (ref 65–99)
HBA1C MFR BLD: 5.7 % (ref 4–5.6)
HCT VFR BLD AUTO: 41.8 % (ref 37–47)
HDLC SERPL-MCNC: 31 MG/DL
HGB BLD-MCNC: 14.6 G/DL (ref 12–16)
IMM GRANULOCYTES # BLD AUTO: 0.26 K/UL (ref 0–0.11)
IMM GRANULOCYTES NFR BLD AUTO: 2.1 % (ref 0–0.9)
LDLC SERPL CALC-MCNC: ABNORMAL MG/DL
LYMPHOCYTES # BLD AUTO: 3.72 K/UL (ref 1–4.8)
LYMPHOCYTES NFR BLD: 30.7 % (ref 22–41)
MCH RBC QN AUTO: 31.2 PG (ref 27–33)
MCHC RBC AUTO-ENTMCNC: 34.9 G/DL (ref 32.2–35.5)
MCV RBC AUTO: 89.3 FL (ref 81.4–97.8)
MONOCYTES # BLD AUTO: 0.6 K/UL (ref 0–0.85)
MONOCYTES NFR BLD AUTO: 5 % (ref 0–13.4)
NEUTROPHILS # BLD AUTO: 7.18 K/UL (ref 1.82–7.42)
NEUTROPHILS NFR BLD: 59.2 % (ref 44–72)
NRBC # BLD AUTO: 0 K/UL
NRBC BLD-RTO: 0 /100 WBC (ref 0–0.2)
PLATELET # BLD AUTO: 226 K/UL (ref 164–446)
PMV BLD AUTO: 11.7 FL (ref 9–12.9)
POTASSIUM SERPL-SCNC: 4 MMOL/L (ref 3.6–5.5)
PROT SERPL-MCNC: 7.5 G/DL (ref 6–8.2)
RBC # BLD AUTO: 4.68 M/UL (ref 4.2–5.4)
SODIUM SERPL-SCNC: 137 MMOL/L (ref 135–145)
T3 SERPL-MCNC: 103 NG/DL (ref 60–181)
T4 SERPL-MCNC: 7.6 UG/DL (ref 4–12)
TRIGL SERPL-MCNC: 586 MG/DL (ref 0–149)
TSH SERPL-ACNC: 2.17 UIU/ML (ref 0.38–5.33)
VIT B12 SERPL-MCNC: 1963 PG/ML (ref 211–911)
WBC # BLD AUTO: 12.1 K/UL (ref 4.8–10.8)

## 2025-06-13 PROCEDURE — 36415 COLL VENOUS BLD VENIPUNCTURE: CPT

## 2025-06-13 PROCEDURE — 80061 LIPID PANEL: CPT

## 2025-06-13 PROCEDURE — 80053 COMPREHEN METABOLIC PANEL: CPT

## 2025-06-13 PROCEDURE — 85025 COMPLETE CBC W/AUTO DIFF WBC: CPT

## 2025-06-13 PROCEDURE — 84443 ASSAY THYROID STIM HORMONE: CPT

## 2025-06-13 PROCEDURE — 84480 ASSAY TRIIODOTHYRONINE (T3): CPT

## 2025-06-13 PROCEDURE — 84436 ASSAY OF TOTAL THYROXINE: CPT

## 2025-06-13 PROCEDURE — 82607 VITAMIN B-12: CPT

## 2025-06-13 PROCEDURE — 83036 HEMOGLOBIN GLYCOSYLATED A1C: CPT
